# Patient Record
Sex: FEMALE | Race: WHITE
[De-identification: names, ages, dates, MRNs, and addresses within clinical notes are randomized per-mention and may not be internally consistent; named-entity substitution may affect disease eponyms.]

---

## 2017-04-23 ENCOUNTER — HOSPITAL ENCOUNTER (EMERGENCY)
Dept: HOSPITAL 62 - ER | Age: 72
Discharge: HOME | End: 2017-04-23
Payer: MEDICARE

## 2017-04-23 VITALS — DIASTOLIC BLOOD PRESSURE: 66 MMHG | SYSTOLIC BLOOD PRESSURE: 131 MMHG

## 2017-04-23 DIAGNOSIS — M53.3: Primary | ICD-10-CM
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
## 2017-04-23 PROCEDURE — 99283 EMERGENCY DEPT VISIT LOW MDM: CPT

## 2017-04-23 PROCEDURE — 72220 X-RAY EXAM SACRUM TAILBONE: CPT

## 2017-04-23 NOTE — ER DOCUMENT REPORT
HPI





- HPI


Patient complains to provider of: tail bone pain


Pain Level: 3


Context: 


72 yo female c/o tailbone pain x 5 days.  fell backwards over a suitcase, 

landed on tailbone on brick step.  denies other injury.  walks without 

difficulty.  painful to sit


Associated Symptoms: None


Exacerbated by: Sitting, Movement


Relieved by: Denies


Similar symptoms previously: No





- DERM


Skin Color: Normal





Past Medical History





- General


Information source: Patient





- Social History


Smoking Status: Never Smoker


Frequency of alcohol use: None


Drug Abuse: None


Lives with: Family


Family History: Reviewed & Not Pertinent


Patient has suicidal ideation: No


Patient has homicidal ideation: No


Renal/ Medical History: Denies: Hx Peritoneal Dialysis





Vertical Provider Document





- CONSTITUTIONAL


Agree With Documented VS: Yes


Exam Limitations: No Limitations


General Appearance: WD/WN, No Apparent Distress





- INFECTION CONTROL


TRAVEL OUTSIDE OF THE [place] IN LAST 30 DAYS: No





- HEENT


HEENT: Atraumatic, PERRLA





- NECK


Neck: Normal Inspection, Supple





- RESPIRATORY


Respiratory: Breath Sounds Normal, No Respiratory Distress


O2 Sat by Pulse Oximetry: 100





- CARDIOVASCULAR


Cardiovascular: Regular Rate, Regular Rhythm





- GI/ABDOMEN


Gastrointestinal: Abdomen Soft, Abdomen Non-Tender





- BACK


Back: Abnormal Inspection - focal sacral tenderness.  no echymosis, no abrasions





- MUSCULOSKELETAL/EXTREMETIES


Musculoskeletal/Extremeties: MAEW, FROM





- NEURO


Level of Consciousness: Awake, Alert, Appropriate





- DERM


Integumentary: Warm, Dry





Course





- Re-evaluation


Re-evalutation: 


04/23/17 10:21


pt examined.  xray ordered.





04/23/17 11:06


no fracture identified.  results reviewed with patient and spouse.  pt stable 

for discharge and follow up wit primary care.  pt agreeable with plan





04/23/17 11:09


BP is noted to be elevated.  pt has hx/o HTN.  taking meds as prescribed.  

elevated reading attributed to pain.  pt is asymptomatic 





- Vital Signs


Vital signs: 


 











Temp Pulse Resp BP Pulse Ox


 


 98.2 F   99   18   156/96 H  100 


 


 04/23/17 09:34  04/23/17 09:34  04/23/17 09:34  04/23/17 09:34  04/23/17 09:34














Discharge





- Discharge


Clinical Impression: 


 Coccygeal pain, acute





Condition: Stable


Disposition: HOME, SELF-CARE


Instructions:  Coccyx Injury ([facility]), Ice Packs ([facility]), Oral Narcotic Medication (

[facility]), Stool Softener ([facility])


Additional Instructions: 


Your xray was negative for fracture


Your injury will take several weeks to heel.


Use pain medication judiciously


Take stool softener daily and increase water intake


Follow up with your primary care if pain persists


Prescriptions: 


Oxycodone HCl/Acetaminophen [Percocet 5-325 mg Tablet] 1 - 2 tab PO ASDIR PRN #

15 tablet


 PRN Reason: 


Forms:  Elevated Blood Pressure

## 2017-06-01 ENCOUNTER — HOSPITAL ENCOUNTER (OUTPATIENT)
Dept: HOSPITAL 62 - RAD | Age: 72
End: 2017-06-01
Attending: INTERNAL MEDICINE
Payer: MEDICARE

## 2017-06-01 DIAGNOSIS — M25.552: Primary | ICD-10-CM

## 2017-06-01 DIAGNOSIS — M53.3: ICD-10-CM

## 2017-06-01 PROCEDURE — 72220 X-RAY EXAM SACRUM TAILBONE: CPT

## 2017-06-01 NOTE — RADIOLOGY REPORT (SQ)
EXAM DESCRIPTION:  SACRUM AND COCCYX



COMPLETED DATE/TIME:  6/1/2017 10:26 am



REASON FOR STUDY:  SACRAL PAIN, LEFT HIP PAIN M25.552  PAIN IN LEFT HIP M53.3  SACROCOCCYGEAL DISORDE
RS, NOT ELSEWHERE CLASSIFIED



COMPARISON:  Sacrum and coccyx plain films 4/23/2017

MRI left hip 6/5/2015



NUMBER OF VIEWS:  Three views.



TECHNIQUE:  AP, lateral, and tilt views of the sacrum and coccyx.



LIMITATIONS:  None.



FINDINGS:  MINERALIZATION: Osteopenic

BONES: Old healed fracture at the sacrococcygeal junction, with mild ventral angulation of the coccyx
.  .  This is best shown on lateral view.

SOFT TISSUES: No soft tissue swelling.  No foreign body.

OTHER: No other significant finding.



IMPRESSION:  No acute fracture.  Old healed fracture at the sacrococcygeal junction with mild ventral
 angulation of the coccyx.



TECHNICAL DOCUMENTATION:  JOB ID:  [id number]

 2011 [employer]- All Rights Reserved

## 2017-06-01 NOTE — RADIOLOGY REPORT (SQ)
EXAM DESCRIPTION:  HIP LEFT AP/LATERAL



COMPLETED DATE/TIME:  6/1/2017 10:26 am



REASON FOR STUDY:  SACRAL PAIN, LEFT HIP PAIN M25.552  PAIN IN LEFT HIP M53.3  SACROCOCCYGEAL DISORDE
RS, NOT ELSEWHERE CLASSIFIED



COMPARISON:  Sacrum and coccyx films same date

Sacrum and coccyx plain films 4/23/2017

MRI left hip 6/5/2015



NUMBER OF VIEWS:  Two views.



TECHNIQUE:  AP pelvis and additional frog-leg view of the left hip.



LIMITATIONS:  None.



FINDINGS:  MINERALIZATION: Osteopenic

LEFT HIP: Mild joint space narrowing.  Small subcortical cysts along the lateral acetabular roof.  Le
ft femoral head without evidence of avascular necrosis or impingement.  No acute fracture left proxim
al femur.

RIGHT HIP: Mild joint space narrowing.  Right femoral head without evidence of avascular necrosis or 
impingement.  No acute fracture right proximal femur.

PUBIS AND ISCHIUM: No acute fracture.  Bony sclerosis.

PELVIS: No fracture.

SACRUM: No acute displaced fracture.

LOWER LUMBAR SPINE: Lower lumbar facet arthropathy at L5-S1

SOFT TISSUES: No findings.

OTHER: No other significant finding.



IMPRESSION:  Left hip osteoarthritis



TECHNICAL DOCUMENTATION:  JOB ID:  [id number]

 2011 [email or web address]- All Rights Reserved

## 2018-04-26 ENCOUNTER — HOSPITAL ENCOUNTER (OUTPATIENT)
Dept: HOSPITAL 62 - LAB | Age: 73
End: 2018-04-26
Attending: INTERNAL MEDICINE
Payer: MEDICARE

## 2018-04-26 DIAGNOSIS — E78.5: ICD-10-CM

## 2018-04-26 DIAGNOSIS — R53.83: ICD-10-CM

## 2018-04-26 DIAGNOSIS — I25.10: ICD-10-CM

## 2018-04-26 DIAGNOSIS — I10: Primary | ICD-10-CM

## 2018-04-26 LAB
ADD MANUAL DIFF: NO
ALBUMIN SERPL-MCNC: 4.1 G/DL (ref 3.5–5)
ALP SERPL-CCNC: 78 U/L (ref 38–126)
ALT SERPL-CCNC: 31 U/L (ref 9–52)
ANION GAP SERPL CALC-SCNC: 9 MMOL/L (ref 5–19)
AST SERPL-CCNC: 26 U/L (ref 14–36)
BASOPHILS # BLD AUTO: 0.1 10^3/UL (ref 0–0.2)
BASOPHILS NFR BLD AUTO: 1 % (ref 0–2)
BILIRUB DIRECT SERPL-MCNC: 0.2 MG/DL (ref 0–0.4)
BILIRUB SERPL-MCNC: 0.5 MG/DL (ref 0.2–1.3)
BUN SERPL-MCNC: 16 MG/DL (ref 7–20)
CALCIUM: 9.8 MG/DL (ref 8.4–10.2)
CHLORIDE SERPL-SCNC: 105 MMOL/L (ref 98–107)
CO2 SERPL-SCNC: 30 MMOL/L (ref 22–30)
EOSINOPHIL # BLD AUTO: 0.2 10^3/UL (ref 0–0.6)
EOSINOPHIL NFR BLD AUTO: 3.3 % (ref 0–6)
ERYTHROCYTE [DISTWIDTH] IN BLOOD BY AUTOMATED COUNT: 13.9 % (ref 11.5–14)
GLUCOSE SERPL-MCNC: 104 MG/DL (ref 75–110)
HCT VFR BLD CALC: 39.6 % (ref 36–47)
HGB BLD-MCNC: 13.4 G/DL (ref 12–15.5)
LDLC SERPL DIRECT ASSAY-MCNC: 110 MG/DL (ref ?–100)
LYMPHOCYTES # BLD AUTO: 1.6 10^3/UL (ref 0.5–4.7)
LYMPHOCYTES NFR BLD AUTO: 27.1 % (ref 13–45)
MCH RBC QN AUTO: 30.8 PG (ref 27–33.4)
MCHC RBC AUTO-ENTMCNC: 33.9 G/DL (ref 32–36)
MCV RBC AUTO: 91 FL (ref 80–97)
MONOCYTES # BLD AUTO: 0.6 10^3/UL (ref 0.1–1.4)
MONOCYTES NFR BLD AUTO: 10.8 % (ref 3–13)
NEUTROPHILS # BLD AUTO: 3.4 10^3/UL (ref 1.7–8.2)
NEUTS SEG NFR BLD AUTO: 57.8 % (ref 42–78)
PLATELET # BLD: 237 10^3/UL (ref 150–450)
POTASSIUM SERPL-SCNC: 3.9 MMOL/L (ref 3.6–5)
PROT SERPL-MCNC: 6.7 G/DL (ref 6.3–8.2)
RBC # BLD AUTO: 4.35 10^6/UL (ref 3.72–5.28)
SODIUM SERPL-SCNC: 143.6 MMOL/L (ref 137–145)
TOTAL CELLS COUNTED % (AUTO): 100 %
TRIGL SERPL-MCNC: 98 MG/DL (ref ?–150)
VLDLC SERPL CALC-MCNC: 20 MG/DL (ref 10–31)
WBC # BLD AUTO: 5.8 10^3/UL (ref 4–10.5)

## 2018-04-26 PROCEDURE — 84443 ASSAY THYROID STIM HORMONE: CPT

## 2018-04-26 PROCEDURE — 80053 COMPREHEN METABOLIC PANEL: CPT

## 2018-04-26 PROCEDURE — 36415 COLL VENOUS BLD VENIPUNCTURE: CPT

## 2018-04-26 PROCEDURE — 80061 LIPID PANEL: CPT

## 2018-04-26 PROCEDURE — 85025 COMPLETE CBC W/AUTO DIFF WBC: CPT

## 2019-06-12 ENCOUNTER — HOSPITAL ENCOUNTER (OUTPATIENT)
Dept: HOSPITAL 62 - LAB | Age: 74
End: 2019-06-12
Attending: INTERNAL MEDICINE
Payer: MEDICARE

## 2019-06-12 DIAGNOSIS — I25.10: Primary | ICD-10-CM

## 2019-06-12 DIAGNOSIS — R53.83: ICD-10-CM

## 2019-06-12 DIAGNOSIS — E03.9: ICD-10-CM

## 2019-06-12 DIAGNOSIS — E78.5: ICD-10-CM

## 2019-06-12 DIAGNOSIS — I10: ICD-10-CM

## 2019-06-12 LAB
ADD MANUAL DIFF: NO
ALBUMIN SERPL-MCNC: 4.4 G/DL (ref 3.5–5)
ALP SERPL-CCNC: 84 U/L (ref 38–126)
ALT SERPL-CCNC: 21 U/L (ref 9–52)
ANION GAP SERPL CALC-SCNC: 8 MMOL/L (ref 5–19)
AST SERPL-CCNC: 31 U/L (ref 14–36)
BASOPHILS # BLD AUTO: 0 10^3/UL (ref 0–0.2)
BASOPHILS NFR BLD AUTO: 0.6 % (ref 0–2)
BILIRUB DIRECT SERPL-MCNC: 0.1 MG/DL (ref 0–0.4)
BILIRUB SERPL-MCNC: 0.8 MG/DL (ref 0.2–1.3)
BUN SERPL-MCNC: 16 MG/DL (ref 7–20)
CALCIUM: 9.9 MG/DL (ref 8.4–10.2)
CHLORIDE SERPL-SCNC: 103 MMOL/L (ref 98–107)
CHOLEST SERPL-MCNC: 224.22 MG/DL (ref 0–200)
CO2 SERPL-SCNC: 29 MMOL/L (ref 22–30)
EOSINOPHIL # BLD AUTO: 0.2 10^3/UL (ref 0–0.6)
EOSINOPHIL NFR BLD AUTO: 3.4 % (ref 0–6)
ERYTHROCYTE [DISTWIDTH] IN BLOOD BY AUTOMATED COUNT: 13.1 % (ref 11.5–14)
GLUCOSE SERPL-MCNC: 98 MG/DL (ref 75–110)
HCT VFR BLD CALC: 39.3 % (ref 36–47)
HGB BLD-MCNC: 13.4 G/DL (ref 12–15.5)
LDLC SERPL DIRECT ASSAY-MCNC: 130 MG/DL (ref ?–100)
LYMPHOCYTES # BLD AUTO: 1.4 10^3/UL (ref 0.5–4.7)
LYMPHOCYTES NFR BLD AUTO: 25.3 % (ref 13–45)
MCH RBC QN AUTO: 30.7 PG (ref 27–33.4)
MCHC RBC AUTO-ENTMCNC: 34 G/DL (ref 32–36)
MCV RBC AUTO: 90 FL (ref 80–97)
MONOCYTES # BLD AUTO: 0.5 10^3/UL (ref 0.1–1.4)
MONOCYTES NFR BLD AUTO: 9.6 % (ref 3–13)
NEUTROPHILS # BLD AUTO: 3.4 10^3/UL (ref 1.7–8.2)
NEUTS SEG NFR BLD AUTO: 61.1 % (ref 42–78)
PLATELET # BLD: 232 10^3/UL (ref 150–450)
POTASSIUM SERPL-SCNC: 4 MMOL/L (ref 3.6–5)
PROT SERPL-MCNC: 7.2 G/DL (ref 6.3–8.2)
RBC # BLD AUTO: 4.35 10^6/UL (ref 3.72–5.28)
SODIUM SERPL-SCNC: 140.3 MMOL/L (ref 137–145)
TOTAL CELLS COUNTED % (AUTO): 100 %
TRIGL SERPL-MCNC: 78 MG/DL (ref ?–150)
VLDLC SERPL CALC-MCNC: 16 MG/DL (ref 10–31)
WBC # BLD AUTO: 5.6 10^3/UL (ref 4–10.5)

## 2019-06-12 PROCEDURE — 84443 ASSAY THYROID STIM HORMONE: CPT

## 2019-06-12 PROCEDURE — 36415 COLL VENOUS BLD VENIPUNCTURE: CPT

## 2019-06-12 PROCEDURE — 80053 COMPREHEN METABOLIC PANEL: CPT

## 2019-06-12 PROCEDURE — 85025 COMPLETE CBC W/AUTO DIFF WBC: CPT

## 2019-06-12 PROCEDURE — 80061 LIPID PANEL: CPT
